# Patient Record
Sex: MALE | Race: WHITE | Employment: FULL TIME | URBAN - NONMETROPOLITAN AREA
[De-identification: names, ages, dates, MRNs, and addresses within clinical notes are randomized per-mention and may not be internally consistent; named-entity substitution may affect disease eponyms.]

---

## 2023-07-06 ENCOUNTER — HOSPITAL ENCOUNTER (OUTPATIENT)
Dept: PHYSICAL THERAPY | Age: 63
Setting detail: THERAPIES SERIES
Discharge: HOME | End: 2023-07-06
Payer: COMMERCIAL

## 2023-07-06 PROCEDURE — 97750 PHYSICAL PERFORMANCE TEST: CPT

## 2023-07-06 NOTE — DISCHARGE SUMMARY
** PLEASE SIGN, DATE AND TIME CERTIFICATION BELOW AND RETURN TO [facility] OUTPATIENT REHABILITATION (FAX #: [phone]). ATTEST/CO-SIGN IF ACCESSING VIA [employer]. THANK YOU.**    I certify that I have examined the patient below and determined that Physical Medicine and Rehabilitation service is necessary and that I approve the established plan of care for up to 90 days or as specifically noted. Attestation, signature or co-signature of physician indicates approval of certification requirements.    ________________________ ____________ __________  Physician Signature   Date   Time      [facility]  PHYSICAL THERAPY  FUNCTIONAL CAPACITY EVALUATION      Date: 2023  Patient Name:  Peter Gonzales  : 1960  MRN: [id number]  CSN: [id number]    Referring Practitioner Nick Perez APR*   Diagnosis Unilateral primary osteoarthritis, left hip [M16.12]  Myalgia, other site [M79.18]    Reason for Referral [facility]   Date of Evaluation 23    Additional Pertinent History HTN      Insurance: Primary: Payor: Bianka Course /  /  / ,   Secondary:    Authorization Information: SECONDARY INSURANCE COMPANY:  n/a   PRE CERTIFICATION REQUIRED: No.  INSURANCE THERAPY BENEFIT:  60 visits PT combined with hydro/massage per calendar year - none used - hard max. AQUATIC THERAPY COVERED: Yes. MODALITIES COVERED:  Yes. TELEHEALTH COVERED:   REFERENCE NUMBER: [id number]. Visit # 1, 1/10 for progress note   Visits Allowed: 60 visits PT combined - hard max   Recertification Date:    Physician Orders: FCE     Total time of physical performance tests/measurements and analysis/interpretation:  Start time: , End time:  , Total time: 90 min    Chief Complaint/Current Symptoms  Patient is a year-old who presents to outpatient therapy services secondary to impaired functional mobility and decreased dexterity/coordination.   Patient referred to physical therapy for functional capacity examination to